# Patient Record
Sex: FEMALE | Race: WHITE | URBAN - METROPOLITAN AREA
[De-identification: names, ages, dates, MRNs, and addresses within clinical notes are randomized per-mention and may not be internally consistent; named-entity substitution may affect disease eponyms.]

---

## 2021-09-14 ENCOUNTER — LAB OUTSIDE AN ENCOUNTER (OUTPATIENT)
Dept: URBAN - METROPOLITAN AREA CLINIC 115 | Facility: CLINIC | Age: 66
End: 2021-09-14

## 2021-09-14 ENCOUNTER — OFFICE VISIT (OUTPATIENT)
Dept: URBAN - METROPOLITAN AREA CLINIC 115 | Facility: CLINIC | Age: 66
End: 2021-09-14
Payer: MEDICARE

## 2021-09-14 DIAGNOSIS — K86.2 CYST OF PANCREAS: ICD-10-CM

## 2021-09-14 DIAGNOSIS — R10.811 RIGHT UPPER QUADRANT ABDOMINAL TENDERNESS WITHOUT REBOUND TENDERNESS: ICD-10-CM

## 2021-09-14 DIAGNOSIS — R10.30 LOWER ABDOMINAL PAIN: ICD-10-CM

## 2021-09-14 DIAGNOSIS — R10.31 RIGHT LOWER QUADRANT ABDOMINAL PAIN: ICD-10-CM

## 2021-09-14 DIAGNOSIS — Z86.010 PERSONAL HISTORY OF COLONIC POLYPS: ICD-10-CM

## 2021-09-14 PROCEDURE — G9903 PT SCRN TBCO ID AS NON USER: HCPCS | Performed by: INTERNAL MEDICINE

## 2021-09-14 PROCEDURE — G8418 CALC BMI BLW LOW PARAM F/U: HCPCS | Performed by: INTERNAL MEDICINE

## 2021-09-14 PROCEDURE — 99213 OFFICE O/P EST LOW 20 MIN: CPT | Performed by: INTERNAL MEDICINE

## 2021-09-14 PROCEDURE — G8427 DOCREV CUR MEDS BY ELIG CLIN: HCPCS | Performed by: INTERNAL MEDICINE

## 2021-09-14 RX ORDER — ONDANSETRON HYDROCHLORIDE 4 MG/1
1 TABLET 15 MIN BEFORE EACH DOSE OF COLON PREP TABLET, FILM COATED ORAL TWICE A DAY
Qty: 2 TABLET | Refills: 0 | OUTPATIENT
Start: 2021-09-14

## 2021-09-14 RX ORDER — BISACODYL 5 MG
1 -3 TABLETS EACH NIGHT FOR 3 NIGHTS BEFORE STARTING PREP TABLET, DELAYED RELEASE (ENTERIC COATED) ORAL ONCE A DAY
Qty: 9 TABLET | Refills: 0 | OUTPATIENT
Start: 2021-09-14 | End: 2021-09-17

## 2021-09-14 RX ORDER — BACILLUS COAGULAN/CALCIUM CARB 250 MM-250
AS DIRECTED TABLET,CHEWABLE ORAL
Status: ACTIVE | COMMUNITY

## 2021-09-14 RX ORDER — URSODIOL 300 MG/1
TAKE 1 CAPSULE (300 MG) BY ORAL ROUTE 2 TIMES PER DAY FOR 30 DAYS CAPSULE ORAL TWICE A DAY
Qty: 180 CAPSULE | Refills: 3

## 2021-09-14 RX ORDER — PARSLEY 450 MG
AS DIRECTED CAPSULE ORAL
Status: ACTIVE | COMMUNITY

## 2021-09-14 RX ORDER — HYOSCYAMINE SULFATE 0.12 MG/1
DISSOLVE  1 TABLET BY ORAL ROUTE 3 TIMES A DAY (BEFORE MEALS) AS NEEDED FOR 30 DAYS TABLET, ORALLY DISINTEGRATING ORAL
Qty: 90 | Refills: 5 | Status: DISCONTINUED | COMMUNITY
Start: 2017-08-11

## 2021-09-14 RX ORDER — TEMAZEPAM 30 MG/1
1 CAPSULE AT BEDTIME AS NEEDED CAPSULE ORAL ONCE A DAY
Status: ACTIVE | COMMUNITY

## 2021-09-14 RX ORDER — OMEPRAZOLE 40 MG/1
TAKE 1 CAPSULE BY ORAL ROUTE DAILY FOR 30 DAYS CAPSULE, DELAYED RELEASE PELLETS ORAL 1
Qty: 30 | Refills: 1 | Status: ON HOLD | COMMUNITY
Start: 2017-08-11

## 2021-09-14 RX ORDER — POLYETHYLENE GLYOCOL 3350, SODIUM CHLORIDE, SODIUM BICARBONATE AND POTASSIUM CHLORIDE 420; 11.2; 5.72; 1.48 G/4L; G/4L; G/4L; G/4L
HALF EVENING BEFORE AND HALF 5 HOURS BEFORE TEST POWDER, FOR SOLUTION NASOGASTRIC; ORAL ONCE
Qty: 1 KIT | Refills: 0 | OUTPATIENT
Start: 2021-09-14 | End: 2021-09-16

## 2021-09-14 RX ORDER — URSODIOL 300 MG/1
TAKE 1 CAPSULE (300 MG) BY ORAL ROUTE 2 TIMES PER DAY FOR 30 DAYS CAPSULE ORAL 2
Qty: 60 | Refills: 6 | Status: ON HOLD | COMMUNITY
Start: 2017-08-11

## 2021-09-14 RX ORDER — CALCIUM CARBONATE 260MG(650)
TABLET,CHEWABLE ORAL
Qty: 0 | Refills: 0 | Status: ON HOLD | COMMUNITY
Start: 1900-01-01

## 2021-09-14 NOTE — HPI-TODAY'S VISIT:
Today on 9/14/21, Patient is a 66 year old female present for gastroenterologist visit for abdominal pain. She has a family history of colon polyps and colon cancer on the paternal side. 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 She has been having pain in her kidney. She has pain and cramping in her RLQ when she is sitting down and with urination. She is able to have a bowel movement everyday. She additionally reports she has been having occasional skin rashes. She has been getting full very quickly and complains of nausea. Her pain wakes her up at night daily.   Time spent with patient: 14 mins

## 2021-09-14 NOTE — PHYSICAL EXAM GASTROINTESTINAL
Abdomen , soft, RLQ tenderness, nondistended , no guarding or rigidity , no masses palpable , normal bowel sounds , Liver and Spleen , no hepatomegaly present , no hepatosplenomegaly , liver nontender , spleen not palpable

## 2021-09-30 ENCOUNTER — TELEPHONE ENCOUNTER (OUTPATIENT)
Dept: URBAN - METROPOLITAN AREA CLINIC 92 | Facility: CLINIC | Age: 66
End: 2021-09-30

## 2021-11-05 ENCOUNTER — LAB OUTSIDE AN ENCOUNTER (OUTPATIENT)
Dept: URBAN - METROPOLITAN AREA CLINIC 5 | Facility: CLINIC | Age: 66
End: 2021-11-05

## 2021-11-05 ENCOUNTER — TELEPHONE ENCOUNTER (OUTPATIENT)
Dept: URBAN - METROPOLITAN AREA CLINIC 5 | Facility: CLINIC | Age: 66
End: 2021-11-05

## 2022-12-01 ENCOUNTER — OFFICE VISIT (OUTPATIENT)
Dept: URBAN - METROPOLITAN AREA CLINIC 82 | Facility: CLINIC | Age: 67
End: 2022-12-01

## 2023-03-01 PROBLEM — 428283002: Status: ACTIVE | Noted: 2021-09-14

## 2023-03-09 ENCOUNTER — OFFICE VISIT (OUTPATIENT)
Dept: URBAN - METROPOLITAN AREA CLINIC 82 | Facility: CLINIC | Age: 68
End: 2023-03-09

## 2023-04-26 ENCOUNTER — OFFICE VISIT (OUTPATIENT)
Dept: URBAN - METROPOLITAN AREA SURGERY CENTER 13 | Facility: SURGERY CENTER | Age: 68
End: 2023-04-26

## 2023-05-04 ENCOUNTER — OFFICE VISIT (OUTPATIENT)
Dept: URBAN - METROPOLITAN AREA CLINIC 82 | Facility: CLINIC | Age: 68
End: 2023-05-04

## 2023-10-04 ENCOUNTER — APPOINTMENT (RX ONLY)
Dept: URBAN - METROPOLITAN AREA CLINIC 45 | Facility: CLINIC | Age: 68
Setting detail: DERMATOLOGY
End: 2023-10-04

## 2023-10-04 DIAGNOSIS — B35.1 TINEA UNGUIUM: ICD-10-CM | Status: INADEQUATELY CONTROLLED

## 2023-10-04 DIAGNOSIS — M67.4 GANGLION: ICD-10-CM | Status: INADEQUATELY CONTROLLED

## 2023-10-04 PROBLEM — D48.5 NEOPLASM OF UNCERTAIN BEHAVIOR OF SKIN: Status: ACTIVE | Noted: 2023-10-04

## 2023-10-04 PROBLEM — D23.62 OTHER BENIGN NEOPLASM OF SKIN OF LEFT UPPER LIMB, INCLUDING SHOULDER: Status: ACTIVE | Noted: 2023-10-04

## 2023-10-04 PROCEDURE — ? PRESCRIPTION

## 2023-10-04 PROCEDURE — ? ADDITIONAL NOTES

## 2023-10-04 PROCEDURE — ? FULL BODY SKIN EXAM - DECLINED

## 2023-10-04 PROCEDURE — ? PHOTO-DOCUMENTATION

## 2023-10-04 PROCEDURE — 99203 OFFICE O/P NEW LOW 30 MIN: CPT

## 2023-10-04 PROCEDURE — ? COUNSELING

## 2023-10-04 PROCEDURE — ? OBSERVATION AND MEASURE

## 2023-10-04 RX ORDER — TERBINAFINE HYDROCHLORIDE 250 MG/1
TABLET ORAL
Qty: 21 | Refills: 0 | Status: ERX | COMMUNITY
Start: 2023-10-04

## 2023-10-04 RX ADMIN — TERBINAFINE HYDROCHLORIDE: 250 TABLET ORAL at 00:00

## 2023-10-04 ASSESSMENT — LOCATION DETAILED DESCRIPTION DERM
LOCATION DETAILED: LEFT DORSAL 2ND TOE
LOCATION DETAILED: LEFT GREAT TOENAIL

## 2023-10-04 ASSESSMENT — LOCATION ZONE DERM
LOCATION ZONE: TOE
LOCATION ZONE: TOENAIL

## 2023-10-04 ASSESSMENT — LOCATION SIMPLE DESCRIPTION DERM
LOCATION SIMPLE: LEFT GREAT TOE
LOCATION SIMPLE: LEFT 2ND TOE

## 2023-10-04 NOTE — PROCEDURE: ADDITIONAL NOTES
Detail Level: Simple
Render Risk Assessment In Note?: no
Additional Notes: If bothersome referred to Ortho.

## 2023-10-23 ENCOUNTER — APPOINTMENT (RX ONLY)
Dept: URBAN - METROPOLITAN AREA CLINIC 45 | Facility: CLINIC | Age: 68
Setting detail: DERMATOLOGY
End: 2023-10-23

## 2023-10-23 DIAGNOSIS — Z79.899 OTHER LONG TERM (CURRENT) DRUG THERAPY: ICD-10-CM

## 2023-10-23 PROCEDURE — ? ORDER TESTS

## 2023-10-23 PROCEDURE — ? VENIPUNCTURE

## 2023-10-23 PROCEDURE — 36415 COLL VENOUS BLD VENIPUNCTURE: CPT

## 2023-10-23 NOTE — PROCEDURE: ORDER TESTS
Billing Type: Patient Bill
Bill For Surgical Tray: no
Lab Facility: [id number]
Expected Date Of Service: 10/23/2023
Performing Laboratory: [id number]

## 2023-10-26 ENCOUNTER — RX ONLY (OUTPATIENT)
Age: 68
Setting detail: RX ONLY
End: 2023-10-26

## 2023-10-26 RX ORDER — FLUCONAZOLE 150 MG/1
TABLET ORAL
Qty: 24 | Refills: 0 | Status: ERX | COMMUNITY
Start: 2023-10-26

## 2023-12-07 ENCOUNTER — OFFICE VISIT (OUTPATIENT)
Dept: URBAN - METROPOLITAN AREA CLINIC 82 | Facility: CLINIC | Age: 68
End: 2023-12-07

## 2024-02-27 ENCOUNTER — LAB (OUTPATIENT)
Dept: URBAN - METROPOLITAN AREA CLINIC 115 | Facility: CLINIC | Age: 69
End: 2024-02-27

## 2024-02-27 ENCOUNTER — OV EP (OUTPATIENT)
Dept: URBAN - METROPOLITAN AREA CLINIC 115 | Facility: CLINIC | Age: 69
End: 2024-02-27
Payer: MEDICARE

## 2024-02-27 VITALS
HEART RATE: 69 BPM | DIASTOLIC BLOOD PRESSURE: 67 MMHG | BODY MASS INDEX: 24.27 KG/M2 | SYSTOLIC BLOOD PRESSURE: 110 MMHG | TEMPERATURE: 96.6 F | WEIGHT: 137 LBS | HEIGHT: 63 IN

## 2024-02-27 DIAGNOSIS — R10.33 PERIUMBILICAL PAIN: ICD-10-CM

## 2024-02-27 DIAGNOSIS — R10.13 EPIGASTRIC PAIN: ICD-10-CM

## 2024-02-27 DIAGNOSIS — Z12.11 COLON CANCER SCREENING: ICD-10-CM

## 2024-02-27 DIAGNOSIS — R13.19 ESOPHAGEAL DYSPHAGIA: ICD-10-CM

## 2024-02-27 DIAGNOSIS — K86.2 CYST OF PANCREAS: ICD-10-CM

## 2024-02-27 PROCEDURE — 99214 OFFICE O/P EST MOD 30 MIN: CPT | Performed by: INTERNAL MEDICINE

## 2024-02-27 RX ORDER — CALCIUM CARBONATE 260MG(650)
TABLET,CHEWABLE ORAL
Qty: 0 | Refills: 0 | Status: ON HOLD | COMMUNITY
Start: 1900-01-01

## 2024-02-27 RX ORDER — ONDANSETRON HYDROCHLORIDE 4 MG/1
1 TABLET 15 MIN BEFORE EACH DOSE OF COLON PREP TABLET, FILM COATED ORAL TWICE A DAY
Qty: 2 TABLET | Refills: 0 | Status: DISCONTINUED | COMMUNITY
Start: 2021-09-14

## 2024-02-27 RX ORDER — OMEPRAZOLE 40 MG/1
TAKE 1 CAPSULE BY ORAL ROUTE DAILY FOR 30 DAYS CAPSULE, DELAYED RELEASE PELLETS ORAL 1
Qty: 30 | Refills: 1 | Status: ON HOLD | COMMUNITY
Start: 2017-08-11

## 2024-02-27 RX ORDER — URSODIOL 300 MG/1
TAKE 1 CAPSULE (300 MG) BY ORAL ROUTE 2 TIMES PER DAY FOR 30 DAYS CAPSULE ORAL TWICE A DAY
Qty: 180 CAPSULE | Refills: 3 | Status: ACTIVE | COMMUNITY

## 2024-02-27 RX ORDER — OMEPRAZOLE 40 MG/1
1 CAPSULE 30 MINUTES BEFORE MORNING MEAL CAPSULE, DELAYED RELEASE ORAL ONCE A DAY
Qty: 30 | Refills: 3 | OUTPATIENT
Start: 2024-02-27

## 2024-02-27 RX ORDER — TEMAZEPAM 30 MG/1
1 CAPSULE AT BEDTIME AS NEEDED CAPSULE ORAL ONCE A DAY
Status: ACTIVE | COMMUNITY

## 2024-02-27 RX ORDER — BACILLUS COAGULAN/CALCIUM CARB 250 MM-250
AS DIRECTED TABLET,CHEWABLE ORAL
Status: ACTIVE | COMMUNITY

## 2024-02-27 RX ORDER — PARSLEY 450 MG
AS DIRECTED CAPSULE ORAL
Status: DISCONTINUED | COMMUNITY

## 2024-02-27 NOTE — HPI-TODAY'S VISIT:
MRI '21 w 1.3cm panc head cyst, rec2y repeat. Colonoscopy '16 w hyperplastic polyp, fhx CRC in grandparent, Dr. Barrett rec 5y repeat at that time. Reports int dypshagia in chest, denies sig gerd, not on ppi. Periumb pains, intermittent. Nml BM w probiotic. No etoh or smoking. Denies nsaids. reports hx int tachycardia, sees cardiology, stable. hx lap donnell. hx kidney stone

## 2024-02-28 LAB
A/G RATIO: 1.8
ALBUMIN: 4.2
ALKALINE PHOSPHATASE: 71
ALT (SGPT): 19
AST (SGOT): 21
BILIRUBIN, TOTAL: 0.4
BUN/CREATININE RATIO: (no result)
BUN: 13
CALCIUM: 9.1
CARBON DIOXIDE, TOTAL: 31
CHLORIDE: 104
CREATININE: 0.61
EGFR: 96
GLOBULIN, TOTAL: 2.4
GLUCOSE: 96
HEMATOCRIT: 41.8
HEMOGLOBIN: 14.2
IMMUNOGLOBULIN A, QN, SERUM: 222
INTERPRETATION: (no result)
LIPASE: 35
MCH: 31.3
MCHC: 34
MCV: 92.3
MPV: 9.2
PLATELET COUNT: 268
POTASSIUM: 4.3
PROTEIN, TOTAL: 6.6
RDW: 12.2
RED BLOOD CELL COUNT: 4.53
SODIUM: 143
T-TRANSGLUTAMINASE (TTG) IGA: <1
WHITE BLOOD CELL COUNT: 7.3

## 2024-04-17 ENCOUNTER — COL/EGD (OUTPATIENT)
Dept: URBAN - METROPOLITAN AREA SURGERY CENTER 13 | Facility: SURGERY CENTER | Age: 69
End: 2024-04-17
Payer: MEDICARE

## 2024-04-17 ENCOUNTER — LAB (OUTPATIENT)
Dept: URBAN - METROPOLITAN AREA CLINIC 4 | Facility: CLINIC | Age: 69
End: 2024-04-17
Payer: MEDICARE

## 2024-04-17 DIAGNOSIS — K29.70 GASTRITIS, UNSPECIFIED, WITHOUT BLEEDING: ICD-10-CM

## 2024-04-17 DIAGNOSIS — K22.89 OTHER SPECIFIED DISEASES OF ESOPHAGUS: ICD-10-CM

## 2024-04-17 DIAGNOSIS — K21.9 GASTROESOPHAGEAL REFLUX DISEASE: ICD-10-CM

## 2024-04-17 DIAGNOSIS — Z12.11 ENCOUNTER FOR SCREENING FOR MALIGNANT NEOPLASM OF COLON: ICD-10-CM

## 2024-04-17 DIAGNOSIS — R13.19 DYSPHAGIA: ICD-10-CM

## 2024-04-17 DIAGNOSIS — D12.7 BENIGN NEOPLASM OF RECTOSIGMOID JUNCTION: ICD-10-CM

## 2024-04-17 DIAGNOSIS — K21.9 GASTRO-ESOPHAGEAL REFLUX DISEASE WITHOUT ESOPHAGITIS: ICD-10-CM

## 2024-04-17 DIAGNOSIS — K31.89 OTHER DISEASES OF STOMACH AND DUODENUM: ICD-10-CM

## 2024-04-17 PROCEDURE — 45381 COLONOSCOPY SUBMUCOUS NJX: CPT | Performed by: INTERNAL MEDICINE

## 2024-04-17 PROCEDURE — 88342 IMHCHEM/IMCYTCHM 1ST ANTB: CPT | Performed by: STUDENT IN AN ORGANIZED HEALTH CARE EDUCATION/TRAINING PROGRAM

## 2024-04-17 PROCEDURE — 43248 EGD GUIDE WIRE INSERTION: CPT | Performed by: INTERNAL MEDICINE

## 2024-04-17 PROCEDURE — 45385 COLONOSCOPY W/LESION REMOVAL: CPT | Performed by: INTERNAL MEDICINE

## 2024-04-17 PROCEDURE — 88305 TISSUE EXAM BY PATHOLOGIST: CPT | Performed by: STUDENT IN AN ORGANIZED HEALTH CARE EDUCATION/TRAINING PROGRAM

## 2024-04-17 PROCEDURE — 43239 EGD BIOPSY SINGLE/MULTIPLE: CPT | Performed by: INTERNAL MEDICINE

## 2024-04-17 RX ORDER — CALCIUM CARBONATE 260MG(650)
TABLET,CHEWABLE ORAL
Qty: 0 | Refills: 0 | Status: ON HOLD | COMMUNITY
Start: 1900-01-01

## 2024-04-17 RX ORDER — OMEPRAZOLE 40 MG/1
TAKE 1 CAPSULE BY ORAL ROUTE DAILY FOR 30 DAYS CAPSULE, DELAYED RELEASE PELLETS ORAL 1
Qty: 30 | Refills: 1 | Status: ON HOLD | COMMUNITY
Start: 2017-08-11

## 2024-04-17 RX ORDER — TEMAZEPAM 30 MG/1
1 CAPSULE AT BEDTIME AS NEEDED CAPSULE ORAL ONCE A DAY
Status: ACTIVE | COMMUNITY

## 2024-04-17 RX ORDER — OMEPRAZOLE 40 MG/1
1 CAPSULE 30 MINUTES BEFORE MORNING MEAL CAPSULE, DELAYED RELEASE ORAL ONCE A DAY
Qty: 30 | Refills: 3 | Status: ACTIVE | COMMUNITY
Start: 2024-02-27

## 2024-04-17 RX ORDER — BACILLUS COAGULAN/CALCIUM CARB 250 MM-250
AS DIRECTED TABLET,CHEWABLE ORAL
Status: ACTIVE | COMMUNITY

## 2024-04-17 RX ORDER — URSODIOL 300 MG/1
TAKE 1 CAPSULE (300 MG) BY ORAL ROUTE 2 TIMES PER DAY FOR 30 DAYS CAPSULE ORAL TWICE A DAY
Qty: 180 CAPSULE | Refills: 3 | Status: ACTIVE | COMMUNITY

## 2024-05-07 ENCOUNTER — DASHBOARD ENCOUNTERS (OUTPATIENT)
Age: 69
End: 2024-05-07

## 2024-05-07 ENCOUNTER — OFFICE VISIT (OUTPATIENT)
Dept: URBAN - METROPOLITAN AREA CLINIC 115 | Facility: CLINIC | Age: 69
End: 2024-05-07
Payer: MEDICARE

## 2024-05-07 ENCOUNTER — LAB OUTSIDE AN ENCOUNTER (OUTPATIENT)
Dept: URBAN - METROPOLITAN AREA CLINIC 115 | Facility: CLINIC | Age: 69
End: 2024-05-07

## 2024-05-07 VITALS
SYSTOLIC BLOOD PRESSURE: 125 MMHG | BODY MASS INDEX: 24.1 KG/M2 | TEMPERATURE: 97.3 F | DIASTOLIC BLOOD PRESSURE: 69 MMHG | HEART RATE: 55 BPM | HEIGHT: 63 IN | WEIGHT: 136 LBS

## 2024-05-07 DIAGNOSIS — D12.6 HIGH GRADE DYSPLASIA IN COLONIC ADENOMA: ICD-10-CM

## 2024-05-07 PROCEDURE — 99213 OFFICE O/P EST LOW 20 MIN: CPT | Performed by: INTERNAL MEDICINE

## 2024-05-07 RX ORDER — OMEPRAZOLE 40 MG/1
1 CAPSULE 30 MINUTES BEFORE MORNING MEAL CAPSULE, DELAYED RELEASE ORAL ONCE A DAY
Qty: 30 | Refills: 3 | Status: ACTIVE | COMMUNITY
Start: 2024-02-27

## 2024-05-07 RX ORDER — OMEPRAZOLE 40 MG/1
TAKE 1 CAPSULE BY ORAL ROUTE DAILY FOR 30 DAYS CAPSULE, DELAYED RELEASE PELLETS ORAL 1
Qty: 30 | Refills: 1 | Status: ON HOLD | COMMUNITY
Start: 2017-08-11

## 2024-05-07 RX ORDER — TEMAZEPAM 30 MG/1
1 CAPSULE AT BEDTIME AS NEEDED CAPSULE ORAL ONCE A DAY
Status: ACTIVE | COMMUNITY

## 2024-05-07 RX ORDER — BACILLUS COAGULAN/CALCIUM CARB 250 MM-250
AS DIRECTED TABLET,CHEWABLE ORAL
Status: ACTIVE | COMMUNITY

## 2024-05-07 RX ORDER — CALCIUM CARBONATE 260MG(650)
TABLET,CHEWABLE ORAL
Qty: 0 | Refills: 0 | Status: ON HOLD | COMMUNITY
Start: 1900-01-01

## 2024-05-07 RX ORDER — URSODIOL 300 MG/1
TAKE 1 CAPSULE (300 MG) BY ORAL ROUTE 2 TIMES PER DAY FOR 30 DAYS CAPSULE ORAL TWICE A DAY
Qty: 180 CAPSULE | Refills: 3 | Status: ACTIVE | COMMUNITY

## 2024-11-11 ENCOUNTER — OFFICE VISIT (OUTPATIENT)
Dept: URBAN - METROPOLITAN AREA SURGERY CENTER 13 | Facility: SURGERY CENTER | Age: 69
End: 2024-11-11

## 2025-07-09 NOTE — HPI: SKIN LESION
What Type Of Note Output Would You Prefer (Optional)?: Bullet Format
How Severe Is Your Skin Lesion?: moderate
Has Your Skin Lesion Been Treated?: not been treated
Is This A New Presentation, Or A Follow-Up?: Skin Lesion
Slightly Suspicious